# Patient Record
Sex: MALE | Race: WHITE | NOT HISPANIC OR LATINO | URBAN - NONMETROPOLITAN AREA
[De-identification: names, ages, dates, MRNs, and addresses within clinical notes are randomized per-mention and may not be internally consistent; named-entity substitution may affect disease eponyms.]

---

## 2019-07-25 NOTE — PATIENT DISCUSSION
Continue warm compresses QHS over both lids. Recommend d/c Maxitrol ointment. Monitor for changes.    RV 1-2 months Exam.

## 2020-07-17 ENCOUNTER — IMPORTED ENCOUNTER (OUTPATIENT)
Dept: URBAN - NONMETROPOLITAN AREA CLINIC 1 | Facility: CLINIC | Age: 50
End: 2020-07-17

## 2020-07-17 PROBLEM — H52.4: Noted: 2020-07-17

## 2020-07-17 PROCEDURE — 92004 COMPRE OPH EXAM NEW PT 1/>: CPT

## 2020-07-17 PROCEDURE — 92015 DETERMINE REFRACTIVE STATE: CPT

## 2022-03-31 NOTE — PATIENT DISCUSSION
Discussed potential cataract surgery in the near future. Pt would like to consider Cataract surgery July 2022.

## 2022-03-31 NOTE — PATIENT DISCUSSION
MAC OCT Performed today stable OU.  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

## 2022-04-09 ASSESSMENT — TONOMETRY
OD_IOP_MMHG: 12
OS_IOP_MMHG: 12

## 2022-04-09 ASSESSMENT — VISUAL ACUITY
OD_SC: 20/25
OS_SC: 20/25